# Patient Record
Sex: MALE | Race: WHITE | NOT HISPANIC OR LATINO
[De-identification: names, ages, dates, MRNs, and addresses within clinical notes are randomized per-mention and may not be internally consistent; named-entity substitution may affect disease eponyms.]

---

## 2019-10-04 ENCOUNTER — APPOINTMENT (OUTPATIENT)
Dept: FAMILY MEDICINE | Facility: CLINIC | Age: 44
End: 2019-10-04
Payer: COMMERCIAL

## 2019-10-04 VITALS
BODY MASS INDEX: 33.5 KG/M2 | WEIGHT: 234 LBS | HEIGHT: 70 IN | DIASTOLIC BLOOD PRESSURE: 80 MMHG | SYSTOLIC BLOOD PRESSURE: 112 MMHG | TEMPERATURE: 98.2 F

## 2019-10-04 DIAGNOSIS — H92.09 OTALGIA, UNSPECIFIED EAR: ICD-10-CM

## 2019-10-04 PROCEDURE — 99213 OFFICE O/P EST LOW 20 MIN: CPT

## 2019-10-04 NOTE — PLAN
[FreeTextEntry1] : Otitis Media of Left Ear\par - Ofloxacin ear drops: 10 drops in the affected ear, BID\par - Avoid water in ears\par - Referral to audiologist for loss of hearing

## 2019-10-04 NOTE — ADDENDUM
[FreeTextEntry1] : I, Alesia Hoskins, acted solely as a scribe for Dr. Benito on this date 10/04/2019.\par \par All medical record entries made by the Scribe were at my, Dr. Benito, direction and personally dictated by me on 10/04/2019. I have reviewed the chart and agree that the record accurately reflects my personal performance of the history, physical exam, assessment and plan.  I have also personally directed, reviewed and agreed with the chart.

## 2019-10-04 NOTE — PHYSICAL EXAM
[Normal] : no acute distress, well-nourished, well developed, well appearing [de-identified] : mild bulging on left ear, decreased light reflex on left ear, mild tenderness to palpation of left ear pinna [de-identified] : AA&Ox3

## 2019-10-04 NOTE — HISTORY OF PRESENT ILLNESS
[FreeTextEntry8] : 43y/o M with no significant PMHx presents to the office with c/o left ear pain that has worsened yesterday. He states pain in the middle of the ear upon palpation. Pt notes he recently traveled to [place]. He also notes swimming, diving into water  and wearing AirPods frequently. Pt denies discharge but admits to some  hearing loss.

## 2019-10-04 NOTE — REVIEW OF SYSTEMS
[Earache] : earache [Hearing Loss] : hearing loss [Negative] : Integumentary [FreeTextEntry4] : left ear pain

## 2020-01-27 ENCOUNTER — APPOINTMENT (OUTPATIENT)
Dept: FAMILY MEDICINE | Facility: CLINIC | Age: 45
End: 2020-01-27
Payer: COMMERCIAL

## 2020-01-27 ENCOUNTER — NON-APPOINTMENT (OUTPATIENT)
Age: 45
End: 2020-01-27

## 2020-01-27 VITALS
HEART RATE: 81 BPM | SYSTOLIC BLOOD PRESSURE: 118 MMHG | BODY MASS INDEX: 32.35 KG/M2 | HEIGHT: 70 IN | OXYGEN SATURATION: 98 % | RESPIRATION RATE: 17 BRPM | WEIGHT: 226 LBS | TEMPERATURE: 98.4 F | DIASTOLIC BLOOD PRESSURE: 82 MMHG

## 2020-01-27 DIAGNOSIS — L43.9 LICHEN PLANUS, UNSPECIFIED: ICD-10-CM

## 2020-01-27 DIAGNOSIS — Z00.00 ENCOUNTER FOR GENERAL ADULT MEDICAL EXAMINATION W/OUT ABNORMAL FINDINGS: ICD-10-CM

## 2020-01-27 PROCEDURE — 99396 PREV VISIT EST AGE 40-64: CPT | Mod: 25

## 2020-01-27 PROCEDURE — 93000 ELECTROCARDIOGRAM COMPLETE: CPT

## 2020-01-27 RX ORDER — OFLOXACIN OTIC 3 MG/ML
0.3 SOLUTION AURICULAR (OTIC) TWICE DAILY
Qty: 1 | Refills: 1 | Status: COMPLETED | COMMUNITY
Start: 2019-10-04 | End: 2020-01-27

## 2020-01-27 NOTE — REVIEW OF SYSTEMS
[Negative] : Neurological [Fever] : no fever [Chills] : no chills [Chest Pain] : no chest pain [Shortness Of Breath] : no shortness of breath [Nausea] : no nausea [Diarrhea] : no diarrhea [Vomiting] : no vomiting [Headache] : no headache

## 2020-01-27 NOTE — PLAN
[FreeTextEntry1] : Health maintenance\par - Referral for colonoscopy\par - Increase fiber intake\par - Flu vaccine UTD\par \par Referral back to Derm

## 2020-01-27 NOTE — HISTORY OF PRESENT ILLNESS
[de-identified] : 46y/o M with no significant PMHx presents to the office for routine annual physical exam. Pt c/o patches on scalp and chin of no hair growth. Pt will f/u with dermatology. Pt reports episode of chills yesterday after a workout. Resolved after sleeping. Pt presents with 8lb weight loss within the past 3 months. Pt states he follows intermittent fasting x 1.5 years. Also admits feeling mildly depressed over the past few months. Pt exercises regularly. States he takes workout pill. All routine labs reviewed with patient and WNL. BP in office is 118/82. Denies HA, CP, SOB, N/V/D, fever, or chills. Admits feeling bump intermittently when wiping.

## 2020-01-27 NOTE — ADDENDUM
[FreeTextEntry1] : I, Alesia Ivanin, acted solely as a scribe for Dr. Benito on this date 01/27/2020.\par \par All medical record entries made by the Scribe were at my, Dr. Benito, direction and personally dictated by me on 01/27/2020. I have reviewed the chart and agree that the record accurately reflects my personal performance of the history, physical exam, assessment and plan.  I have also personally directed, reviewed and agreed with the chart.

## 2020-01-27 NOTE — PHYSICAL EXAM
[Normal] : normal gait, coordination grossly intact, no focal deficits [de-identified] : no cervical lymphadenopathy  [de-identified] : varied diffuse areas of flesh colored  hypopigmentation of scalp and chin, erythematous rash on cheeks [de-identified] : AA&Ox3

## 2021-03-21 ENCOUNTER — TRANSCRIPTION ENCOUNTER (OUTPATIENT)
Age: 46
End: 2021-03-21

## 2022-04-16 ENCOUNTER — TRANSCRIPTION ENCOUNTER (OUTPATIENT)
Age: 47
End: 2022-04-16

## 2024-05-30 ENCOUNTER — NON-APPOINTMENT (OUTPATIENT)
Age: 49
End: 2024-05-30